# Patient Record
Sex: FEMALE | Race: ASIAN | Employment: FULL TIME | URBAN - METROPOLITAN AREA
[De-identification: names, ages, dates, MRNs, and addresses within clinical notes are randomized per-mention and may not be internally consistent; named-entity substitution may affect disease eponyms.]

---

## 2017-03-20 ENCOUNTER — OFFICE VISIT (OUTPATIENT)
Dept: OPTOMETRY | Facility: CLINIC | Age: 22
End: 2017-03-20

## 2017-03-20 DIAGNOSIS — H52.13 MYOPIA, BILATERAL: Primary | ICD-10-CM

## 2017-03-20 PROCEDURE — 92015 DETERMINE REFRACTIVE STATE: CPT | Performed by: OPTOMETRIST

## 2017-03-20 PROCEDURE — 92012 INTRM OPH EXAM EST PATIENT: CPT | Performed by: OPTOMETRIST

## 2017-03-20 NOTE — ASSESSMENT & PLAN NOTE
New glasses RX given to update as needed. Patient knows that if she has any difficulty adjusting to the new RX she can call.

## 2017-03-20 NOTE — PATIENT INSTRUCTIONS
Myopia  New glasses RX given to update as needed. Patient knows that if she has any difficulty adjusting to the new RX she can call.

## 2017-03-20 NOTE — PROGRESS NOTES
Narciso Kessler is a 24year old female. HPI:     HPI     Patient is in for an annual eye exam. Patient feels that her glasses are not helping her see distance anymore. Difficult to see subtitles and road signs. Patient is no longer bothered by f Clear Clear    Anterior Chamber Deep and quiet Deep and quiet    Iris Normal Normal    Lens Clear Clear      Fundus Exam      Right Left    Disc Normal no swelling Normal no swelling    C/D Ratio 0.1 0.1    Macula Normal Normal    Vessels Normal Normal

## 2017-03-23 ENCOUNTER — TELEPHONE (OUTPATIENT)
Dept: OPTOMETRY | Facility: CLINIC | Age: 22
End: 2017-03-23

## 2017-03-23 NOTE — TELEPHONE ENCOUNTER
Pt's mom returning rn's call, please call: [phone] ( re: insurance? Per mom, was told by Florian Tarango to ask for her. I offered to register insurance and mom declined) please call, thank you.

## 2017-08-02 ENCOUNTER — OFFICE VISIT (OUTPATIENT)
Dept: INTERNAL MEDICINE CLINIC | Facility: CLINIC | Age: 22
End: 2017-08-02

## 2017-08-02 VITALS
BODY MASS INDEX: 19.71 KG/M2 | HEART RATE: 78 BPM | HEIGHT: 61.5 IN | WEIGHT: 105.75 LBS | SYSTOLIC BLOOD PRESSURE: 100 MMHG | DIASTOLIC BLOOD PRESSURE: 68 MMHG | RESPIRATION RATE: 19 BRPM | TEMPERATURE: 99 F

## 2017-08-02 DIAGNOSIS — Z00.00 ROUTINE GENERAL MEDICAL EXAMINATION AT A HEALTH CARE FACILITY: Primary | ICD-10-CM

## 2017-08-02 DIAGNOSIS — N92.6 IRREGULAR MENSES: ICD-10-CM

## 2017-08-02 DIAGNOSIS — G44.209 TENSION HEADACHE: ICD-10-CM

## 2017-08-02 DIAGNOSIS — R63.6 UNDERWEIGHT: ICD-10-CM

## 2017-08-02 PROCEDURE — 99203 OFFICE O/P NEW LOW 30 MIN: CPT | Performed by: INTERNAL MEDICINE

## 2017-08-02 PROCEDURE — 99385 PREV VISIT NEW AGE 18-39: CPT | Performed by: INTERNAL MEDICINE

## 2017-08-02 NOTE — PATIENT INSTRUCTIONS
Please do consider completed the HPV vaccination series. You are due for 2 more injections. You need 3 eight ounce servings of calcium/vitamin D daily. This includes spinach, kale, broccoli, almonds, milk, yogurt, or cottage cheese.        Tension Heada treatment plan. Ask if you can have medicine to take at home the next time you get a bad headache. This may keep you from having to visit the emergency department in the future.  You may need to see a headache specialist (neurologist) if you continue to hav headache  When you have a tension-type headache, there are things you can do to relax, loosen muscles, and reduce the pain:  · Brush your scalp lightly with a soft hairbrush.   · Give yourself a massage. Knead the muscles running from your shoulders up the breath out. · Visualization. Imagining a peaceful, secure scene can give you a sense of control over your body and surroundings. · Progressive relaxation. This is done by tightening and then releasing muscle groups.  Start at the top of your head and work tension. Clenching your jaw or grinding your teeth can result in tension and pain. This may happen while you sleep without your knowing it. · Eye problems. Eyestrain can cause tension in the muscles around the eyes.  Or a problem with your eyeglass prescri overall health and help you to relax. · Neck exercises help keep your neck muscles relaxed during the day. Try the neck exercises shown on this sheet. Start in a neutral (relaxed, centered) position. Do 3 repetitions every 2 to 4 hours throughout the day. muscles, soak in a hot bath or use a hot shower. Use medicines  Aspirin or aspirin substitutes, such as ibuprofen and acetaminophen, can relieve headache.  Remember: Never give aspirin to anyone 25years old or younger because of the risk of developing Benjamin information is not intended as a substitute for professional medical care. Always follow your healthcare professional's instructions. What Are Migraine and Tension Headaches?     Although there are several types of headaches, migraine and tension hea a stroke. Is it a tension headache? This type of headache is usually a dull ache or a sensation of pressure on both sides of the head. It may be associated with pain or tension in the neck and shoulders.  Depression, anxiety, and stress can cause a tensio biofeedback. · Maintain a regular exercise regimen under the guidance of a doctor. This can help keep your neck and back flexible, strong, and relaxed. To relieve the pain  Take the following steps:  · Use moist heat to relax the muscles.  Soak in a hot b

## 2017-08-02 NOTE — PROGRESS NOTES
Thai Romero is a 25year old female. HPI:   Patient presents for physical exam but she has other complaints also. Knows she will be billed for TWO visits. Plans to move to [place] in the fall. 1. Headaches: having a headache every few weeks. are based on CDC 2-20 Years data.     No Known Allergies    Family History   Problem Relation Age of Onset   • Thyroid Disorder Mother      hypothyroidism   • Diabetes Mother    • Diabetes Other      family h/o   • Cancer Maternal Aunt      multiple myeloma Screening: never sexually active, declines pap smear for now. Breast Cancer Screening: no FHx. No screening indicated at this time.    Bone Health/Fall Prevention (Marquis Chi): educated on dietary calcium/vit D3, weight bearing exercises  Vaccines: TDAP 2008

## 2019-03-15 ENCOUNTER — OFFICE VISIT (OUTPATIENT)
Dept: INTERNAL MEDICINE CLINIC | Facility: CLINIC | Age: 24
End: 2019-03-15
Payer: COMMERCIAL

## 2019-03-15 ENCOUNTER — HOSPITAL ENCOUNTER (OUTPATIENT)
Dept: GENERAL RADIOLOGY | Age: 24
Discharge: HOME | End: 2019-03-15
Attending: INTERNAL MEDICINE
Payer: COMMERCIAL

## 2019-03-15 VITALS
DIASTOLIC BLOOD PRESSURE: 58 MMHG | TEMPERATURE: 99 F | RESPIRATION RATE: 16 BRPM | WEIGHT: 101.75 LBS | HEIGHT: 61.25 IN | BODY MASS INDEX: 18.96 KG/M2 | SYSTOLIC BLOOD PRESSURE: 108 MMHG | HEART RATE: 68 BPM

## 2019-03-15 DIAGNOSIS — M54.41 ACUTE RIGHT-SIDED LOW BACK PAIN WITH RIGHT-SIDED SCIATICA: ICD-10-CM

## 2019-03-15 DIAGNOSIS — M54.41 ACUTE RIGHT-SIDED LOW BACK PAIN WITH RIGHT-SIDED SCIATICA: Primary | ICD-10-CM

## 2019-03-15 PROCEDURE — 99214 OFFICE O/P EST MOD 30 MIN: CPT | Performed by: INTERNAL MEDICINE

## 2019-03-15 PROCEDURE — 72100 X-RAY EXAM L-S SPINE 2/3 VWS: CPT | Performed by: INTERNAL MEDICINE

## 2019-03-15 NOTE — PATIENT INSTRUCTIONS
- We will check an x-ray of your back  - Do home therapy exercises to strengthen the back  - If your pain comes back, see us in the clinic, or see a doctor in [place]. You will likely need a physical therapy referral at that point.     It was a pleasure seei

## 2019-03-15 NOTE — PROGRESS NOTES
Regine Marinelli is a 21year old female.    HPI:   Patient presents with:  Back Pain: sever back pain for the last week hard to get out of bed   Other: radiating pain down the R hip and leg fell in November   Patient presents with musculoskeletal com adult)   Pulse 68   Temp 98.5 °F (36.9 °C) (Oral)   Resp 16   Ht 61.25\"   Wt 101 lb 12 oz   LMP 02/25/2019 (LMP Unknown)   BMI 19.07 kg/m²   GENERAL: Alert and oriented, well developed, well nourished,in no apparent distress  HEENT: atraumatic, PERRLA, EO

## 2019-03-20 ENCOUNTER — TELEPHONE (OUTPATIENT)
Dept: INTERNAL MEDICINE CLINIC | Facility: CLINIC | Age: 24
End: 2019-03-20

## 2019-03-20 NOTE — TELEPHONE ENCOUNTER
Pt notified of results and provider instructions. Pt verbalized understanding. Pt requesting copy of XR report. Copy placed at  file folder under \"P\". Pt aware.

## 2020-10-01 ENCOUNTER — OFFICE VISIT (OUTPATIENT)
Dept: OPTOMETRY | Facility: CLINIC | Age: 25
End: 2020-10-01
Payer: COMMERCIAL

## 2020-10-01 DIAGNOSIS — H52.13 MYOPIA OF BOTH EYES: Primary | ICD-10-CM

## 2020-10-01 PROCEDURE — 92015 DETERMINE REFRACTIVE STATE: CPT | Performed by: OPTOMETRIST

## 2020-10-01 PROCEDURE — 92012 INTRM OPH EXAM EST PATIENT: CPT | Performed by: OPTOMETRIST

## 2020-10-01 NOTE — ASSESSMENT & PLAN NOTE
New glasses RX given to update as needed. 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

## 2020-10-01 NOTE — PROGRESS NOTES
Usha Irwin is a 22year old female. HPI:     HPI     Patient is in for her annual eye exam. She last had an EE in 3-2017 with a mild RX change.  Dim lighting is bothersome and sees better outdoors    Last edited by Karyna Weber, SIMONA on 10/1/202 Exam       Right Left    External Normal Normal          Slit Lamp Exam       Right Left    Lids/Lashes Normal Normal    Conjunctiva/Sclera Normal Normal    Cornea Clear Clear    Anterior Chamber Deep and quiet Deep and quiet    Iris Normal Normal    Lens

## 2020-10-01 NOTE — PATIENT INSTRUCTIONS
Myopia  New glasses RX given to update as needed. 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

## 2020-11-12 ENCOUNTER — HOSPITAL ENCOUNTER (OUTPATIENT)
Age: 25
Discharge: HOME | End: 2020-11-12
Payer: COMMERCIAL

## 2020-11-12 ENCOUNTER — APPOINTMENT (OUTPATIENT)
Dept: GENERAL RADIOLOGY | Age: 25
End: 2020-11-12
Attending: PHYSICIAN ASSISTANT
Payer: COMMERCIAL

## 2020-11-12 VITALS
WEIGHT: 100 LBS | RESPIRATION RATE: 16 BRPM | HEIGHT: 61 IN | BODY MASS INDEX: 18.88 KG/M2 | HEART RATE: 74 BPM | OXYGEN SATURATION: 100 % | DIASTOLIC BLOOD PRESSURE: 69 MMHG | TEMPERATURE: 99 F | SYSTOLIC BLOOD PRESSURE: 101 MMHG

## 2020-11-12 DIAGNOSIS — R06.89 SIGHING RESPIRATION: Primary | ICD-10-CM

## 2020-11-12 PROCEDURE — 93010 ELECTROCARDIOGRAM REPORT: CPT

## 2020-11-12 PROCEDURE — 93005 ELECTROCARDIOGRAM TRACING: CPT

## 2020-11-12 PROCEDURE — 99214 OFFICE O/P EST MOD 30 MIN: CPT

## 2020-11-12 PROCEDURE — 80047 BASIC METABLC PNL IONIZED CA: CPT

## 2020-11-12 PROCEDURE — 85025 COMPLETE CBC W/AUTO DIFF WBC: CPT | Performed by: PHYSICIAN ASSISTANT

## 2020-11-12 PROCEDURE — 99204 OFFICE O/P NEW MOD 45 MIN: CPT

## 2020-11-12 PROCEDURE — 36415 COLL VENOUS BLD VENIPUNCTURE: CPT

## 2020-11-12 PROCEDURE — 71046 X-RAY EXAM CHEST 2 VIEWS: CPT | Performed by: PHYSICIAN ASSISTANT

## 2020-11-12 NOTE — ED INITIAL ASSESSMENT (HPI)
C/o racing heart and needing to take deeper breaths due to not getting enough air intermittently since February. States breathing is getting worse over the past 2 weeks.

## 2020-11-12 NOTE — ED PROVIDER NOTES
Patient Seen in: [facility]      History   Patient presents with:  Difficulty Breathing    Stated Complaint: Difficulty breathing 2 wks    HPI    Philip Mooney is a 77-year-old female who presents today for evaluation of abnormal breathing.   She (Room air)       Current:/69   Pulse 74   Temp 99.3 °F (37.4 °C) (Temporal)   Resp 16   Ht 154.9 cm (5' 1\")   Wt 45.4 kg   LMP 10/29/2020   SpO2 100%   BMI 18.89 kg/m²         Physical Exam    Nursing note reviewed. Vital signs reviewed.   Constituti (cpt=71046)    Result Date: 11/12/2020  PROCEDURE:  XR CHEST PA + LAT CHEST (CPT=71046)  INDICATIONS:  Difficulty breathing 2 wks  COMPARISON:  None. TECHNIQUE:  PA and lateral chest radiographs were obtained.   PATIENT STATED HISTORY: (As transcribed by T

## 2020-11-16 ENCOUNTER — OFFICE VISIT (OUTPATIENT)
Dept: INTERNAL MEDICINE CLINIC | Facility: CLINIC | Age: 25
End: 2020-11-16
Payer: COMMERCIAL

## 2020-11-16 VITALS
TEMPERATURE: 99 F | RESPIRATION RATE: 15 BRPM | HEART RATE: 68 BPM | HEIGHT: 61 IN | DIASTOLIC BLOOD PRESSURE: 60 MMHG | SYSTOLIC BLOOD PRESSURE: 100 MMHG | WEIGHT: 97.81 LBS | BODY MASS INDEX: 18.47 KG/M2

## 2020-11-16 DIAGNOSIS — R06.02 SHORTNESS OF BREATH: Primary | ICD-10-CM

## 2020-11-16 DIAGNOSIS — R00.2 INTERMITTENT PALPITATIONS: ICD-10-CM

## 2020-11-16 PROCEDURE — 99213 OFFICE O/P EST LOW 20 MIN: CPT | Performed by: INTERNAL MEDICINE

## 2020-11-16 PROCEDURE — 3074F SYST BP LT 130 MM HG: CPT | Performed by: INTERNAL MEDICINE

## 2020-11-16 PROCEDURE — 99072 ADDL SUPL MATRL&STAF TM PHE: CPT | Performed by: INTERNAL MEDICINE

## 2020-11-16 PROCEDURE — 3078F DIAST BP <80 MM HG: CPT | Performed by: INTERNAL MEDICINE

## 2020-11-16 PROCEDURE — 3008F BODY MASS INDEX DOCD: CPT | Performed by: INTERNAL MEDICINE

## 2020-11-16 NOTE — PATIENT INSTRUCTIONS
- Start prescription inhaler (fluticasone). Use 1 puff twice daily for next 2-3 weeks  - Schedule Holter monitor (heartbeat monitor). Call [phone] to schedule.   This is placed at the hospital.  You will wear it for 24 hours, then return it to the [facility]

## 2020-11-16 NOTE — PROGRESS NOTES
Michael Mckeon is a 22year old female.    HPI:   Patient presents with:  Shortness Of Breath: x2 weeks not getting enough oxygen she has to take deep breaths to feel like shes geting eoungh air she also stated she feel like her heart is racing some includes Cancer in her maternal aunt and paternal grandfather; Diabetes in her mother and another family member; Heart Disorder in her maternal grandfather; Thyroid Disorder in her mother. Social:  reports that she has never smoked.  She has never used smo Will try a trial of Flovent inhaler BID. Will check Holter monitor. If Holter unremarkable and Flovent does not help, will consider referral to Pulmonology for further evaluation/possible PFT's.   Advised patient to return to immediate care or go to ED w Fernando Carranza(Attending)

## 2020-11-19 ENCOUNTER — HOSPITAL ENCOUNTER (OUTPATIENT)
Dept: CV DIAGNOSTICS | Age: 25
Discharge: HOME | End: 2020-11-19
Attending: INTERNAL MEDICINE
Payer: COMMERCIAL

## 2020-11-19 DIAGNOSIS — R06.02 SHORTNESS OF BREATH: ICD-10-CM

## 2020-11-19 DIAGNOSIS — R00.2 INTERMITTENT PALPITATIONS: ICD-10-CM

## 2020-11-19 PROCEDURE — 93225 XTRNL ECG REC<48 HRS REC: CPT | Performed by: INTERNAL MEDICINE

## 2020-11-19 PROCEDURE — 93226 XTRNL ECG REC<48 HR SCAN A/R: CPT | Performed by: INTERNAL MEDICINE

## 2020-11-19 PROCEDURE — 93227 XTRNL ECG REC<48 HR R&I: CPT | Performed by: INTERNAL MEDICINE

## 2020-11-21 ENCOUNTER — TELEPHONE (OUTPATIENT)
Dept: INTERNAL MEDICINE CLINIC | Facility: CLINIC | Age: 25
End: 2020-11-21

## 2020-11-21 RX ORDER — ALBUTEROL SULFATE 90 UG/1
2 AEROSOL, METERED RESPIRATORY (INHALATION) EVERY 6 HOURS PRN
Qty: 1 INHALER | Refills: 0 | Status: SHIPPED | OUTPATIENT
Start: 2020-11-21

## 2020-11-21 NOTE — TELEPHONE ENCOUNTER
Patient paged on call service regarding increased difficulty breathing that has been occurring the past 3 weeks but worsening today. The pt has been seen at [facility] and by Dr. Domnick Sacks. She has been using her ICS inhaler BID.  Denies wheezing but feels it is hard

## 2020-12-19 ENCOUNTER — LAB ENCOUNTER (OUTPATIENT)
Dept: LAB | Age: 25
End: 2020-12-19
Attending: INTERNAL MEDICINE
Payer: COMMERCIAL

## 2020-12-19 DIAGNOSIS — R63.4 UNINTENTIONAL WEIGHT LOSS: ICD-10-CM

## 2020-12-19 DIAGNOSIS — L65.9 HAIR LOSS: ICD-10-CM

## 2020-12-19 PROCEDURE — 83540 ASSAY OF IRON: CPT

## 2020-12-19 PROCEDURE — 36415 COLL VENOUS BLD VENIPUNCTURE: CPT

## 2020-12-19 PROCEDURE — 83550 IRON BINDING TEST: CPT

## 2020-12-19 PROCEDURE — 84443 ASSAY THYROID STIM HORMONE: CPT

## 2020-12-19 PROCEDURE — 84439 ASSAY OF FREE THYROXINE: CPT

## 2021-07-26 ENCOUNTER — TELEPHONE (OUTPATIENT)
Dept: INTERNAL MEDICINE CLINIC | Facility: CLINIC | Age: 26
End: 2021-07-26

## 2021-07-26 NOTE — TELEPHONE ENCOUNTER
Ok to keep visit as in office visit in light of recent negative COVID test and fully vaccinated status.

## 2021-07-26 NOTE — TELEPHONE ENCOUNTER
Patient made an appointment via [facility] for a cough. Per epic vaccination status is unknown.      Please triage if necessary     Future Appointments   Date Time Provider Flako Pearce   7/28/2021  1:00 PM Brian Gardner MD [facility] [place]

## 2021-07-26 NOTE — TELEPHONE ENCOUNTER
Pt returned from [place] on Sunday 7/18. Developed sore throat on 7/17 and cough with clear phlegm on 7/18. Tested negative for COVID at [facility] on 7/18. Abiodun Anthony 79 vaccine received on 4/8/21 and 2nd vaccine 4/29.   No c/o fever, chills, sweats, loss o

## 2023-08-10 ENCOUNTER — OFFICE VISIT (OUTPATIENT)
Dept: INTERNAL MEDICINE CLINIC | Facility: CLINIC | Age: 28
End: 2023-08-10
Payer: COMMERCIAL

## 2023-08-10 VITALS
HEART RATE: 67 BPM | HEIGHT: 60.79 IN | TEMPERATURE: 98 F | RESPIRATION RATE: 16 BRPM | DIASTOLIC BLOOD PRESSURE: 70 MMHG | BODY MASS INDEX: 19.97 KG/M2 | WEIGHT: 104.38 LBS | OXYGEN SATURATION: 100 % | SYSTOLIC BLOOD PRESSURE: 110 MMHG

## 2023-08-10 DIAGNOSIS — M54.41 ACUTE MIDLINE LOW BACK PAIN WITH RIGHT-SIDED SCIATICA: Primary | ICD-10-CM

## 2023-08-10 PROCEDURE — 3074F SYST BP LT 130 MM HG: CPT | Performed by: INTERNAL MEDICINE

## 2023-08-10 PROCEDURE — 3008F BODY MASS INDEX DOCD: CPT | Performed by: INTERNAL MEDICINE

## 2023-08-10 PROCEDURE — 99214 OFFICE O/P EST MOD 30 MIN: CPT | Performed by: INTERNAL MEDICINE

## 2023-08-10 PROCEDURE — 3078F DIAST BP <80 MM HG: CPT | Performed by: INTERNAL MEDICINE

## 2023-08-10 RX ORDER — MELOXICAM 7.5 MG/1
7.5 TABLET ORAL DAILY
Qty: 30 TABLET | Refills: 0 | Status: SHIPPED | OUTPATIENT
Start: 2023-08-10

## 2023-08-10 RX ORDER — CYCLOBENZAPRINE HCL 10 MG
10 TABLET ORAL NIGHTLY PRN
Qty: 21 TABLET | Refills: 0 | Status: SHIPPED | OUTPATIENT
Start: 2023-08-10

## 2023-08-10 NOTE — PATIENT INSTRUCTIONS
- See handout for home physical therapy exercises  - Follow up with [id number] [facility] for further treatment/exercises and to see what you can do to prevent flare-ups in the future  Locations should be on next page otherwise you can go to the [facility] website:  [email or web address]  - Take meloxicam (anti-inflammatory) and cyclobenzaprine (muscle relaxant) as needed for back pain. The cyclobenzaprine can cause drowsiness so recommend taking it at night and not driving after taking it. - Let us know if you develop any of the following symptoms: weakness or numbness in either leg, instability when walking, incontinence (bowel or bladder), fevers/chills/sweats. We may need to do further imaging if you develop any of these symptoms. It was a pleasure seeing you in the clinic today. Thank you for choosing the [facility] office for your healthcare needs. Please call at [phone] with any questions or concerns.     Sylvia Kaufman MD
